# Patient Record
Sex: FEMALE | Race: WHITE | NOT HISPANIC OR LATINO | URBAN - NONMETROPOLITAN AREA
[De-identification: names, ages, dates, MRNs, and addresses within clinical notes are randomized per-mention and may not be internally consistent; named-entity substitution may affect disease eponyms.]

---

## 2021-06-01 ENCOUNTER — OFFICE VISIT (OUTPATIENT)
Dept: URBAN - NONMETROPOLITAN AREA CLINIC 2 | Facility: CLINIC | Age: 51
End: 2021-06-01
Payer: COMMERCIAL

## 2021-06-01 ENCOUNTER — WEB ENCOUNTER (OUTPATIENT)
Dept: URBAN - NONMETROPOLITAN AREA CLINIC 2 | Facility: CLINIC | Age: 51
End: 2021-06-01

## 2021-06-01 ENCOUNTER — LAB OUTSIDE AN ENCOUNTER (OUTPATIENT)
Dept: URBAN - NONMETROPOLITAN AREA CLINIC 2 | Facility: CLINIC | Age: 51
End: 2021-06-01

## 2021-06-01 DIAGNOSIS — Z12.11 COLON CANCER SCREENING: ICD-10-CM

## 2021-06-01 DIAGNOSIS — K21.9 GERD (GASTROESOPHAGEAL REFLUX DISEASE): ICD-10-CM

## 2021-06-01 DIAGNOSIS — K64.4 EXTERNAL HEMORRHOID: ICD-10-CM

## 2021-06-01 DIAGNOSIS — Z83.71 FAMILY HISTORY OF COLONIC POLYPS: ICD-10-CM

## 2021-06-01 PROCEDURE — 99204 OFFICE O/P NEW MOD 45 MIN: CPT | Performed by: INTERNAL MEDICINE

## 2021-06-01 RX ORDER — DEXLANSOPRAZOLE 60 MG/1
TAKE 1 CAPSULE (60 MG) BY ORAL ROUTE ONCE DAILY CAPSULE, DELAYED RELEASE ORAL 1
OUTPATIENT

## 2021-06-01 RX ORDER — LISINOPRIL 20 MG/1
TABLET ORAL
Qty: 90 UNSPECIFIED | Status: ACTIVE | COMMUNITY

## 2021-06-01 RX ORDER — BUPROPION HYDROCHLORIDE 150 MG/1
TABLET, EXTENDED RELEASE ORAL
Qty: 90 UNSPECIFIED | Status: ACTIVE | COMMUNITY

## 2021-06-01 RX ORDER — DEXLANSOPRAZOLE 60 MG/1
TAKE 1 CAPSULE (60 MG) BY ORAL ROUTE ONCE DAILY CAPSULE, DELAYED RELEASE ORAL 1
Qty: 0 | Refills: 0 | Status: ACTIVE | COMMUNITY
Start: 1900-01-01

## 2021-06-01 RX ORDER — SODIUM PICOSULFATE, MAGNESIUM OXIDE, AND ANHYDROUS CITRIC ACID 10; 3.5; 12 MG/160ML; G/160ML; G/160ML
160 ML AS DIRECTED LIQUID ORAL ONCE
Qty: 1 KIT | Refills: 0 | OUTPATIENT
Start: 2021-06-01 | End: 2021-06-02

## 2021-06-01 NOTE — HPI-TODAY'S VISIT:
6/1/21 Mrs. Denisse Jacques is a very pleasant 50YO F who presents with complaint of hemorrhoids. She reports hx external hemorrhoid for 16 years. Recently she was helping transfer her debilitated mother in law to the toilet and had significant straining. She felt like the hemorrhoid was 2-3 times normal size when she went to the bathroom. This was tender and she noted a scant amount of brb on the toilet tissue. She treated with vaseline and desitin diaper rash ointment and this was improved. Currently, hemorrhoid is typical size but would like to see if something can be done. Denies any bleeding at this time. Her bowel movements are normal.   She has history of GERD with last EGD in 2018 by Dr. Mancia with erosive gastropathy and reflux esophagitis. Histology was negative for Barretts, H. pylori, and celiac. She was to follow up in office and consider weaning Dexilant 60mg daily to 30mg daily but did not follow up. She has family history of esophageal cancer in her father.   She last had colonoscopy by Dr. Marc Corea in 2015 that was normal and recommended to repeat in 5 years. She reports f/h of colon polyps in her mother and father. Recent labs with Dr. Latricia Maynard were normal at the time of her physical last week. TG

## 2021-06-01 NOTE — PHYSICAL EXAM GASTROINTESTINAL
Abdomen  , soft, nontender, nondistended , no masses palpable , normal bowel sounds , no hepatomegaly present  , Rectal , normal sphincter tone , nonbleeding moderate to large external hemorrhoids noted, rectal masses or bleeding present

## 2021-06-30 ENCOUNTER — OFFICE VISIT (OUTPATIENT)
Dept: URBAN - NONMETROPOLITAN AREA SURGERY CENTER 1 | Facility: SURGERY CENTER | Age: 51
End: 2021-06-30
Payer: COMMERCIAL

## 2021-06-30 DIAGNOSIS — Z80.0 FAMILY HISTORY MALIGNANT NEOPLASM OF BILIARY TRACT: ICD-10-CM

## 2021-06-30 DIAGNOSIS — Z12.11 COLON CANCER SCREENING: ICD-10-CM

## 2021-06-30 PROCEDURE — G0105 COLORECTAL SCRN; HI RISK IND: HCPCS | Performed by: INTERNAL MEDICINE

## 2021-06-30 PROCEDURE — G8907 PT DOC NO EVENTS ON DISCHARG: HCPCS | Performed by: INTERNAL MEDICINE

## 2021-07-06 ENCOUNTER — ERX REFILL RESPONSE (OUTPATIENT)
Dept: URBAN - NONMETROPOLITAN AREA CLINIC 2 | Facility: CLINIC | Age: 51
End: 2021-07-06

## 2021-07-06 RX ORDER — DEXLANSOPRAZOLE 30 MG/1
TAKE 1 CAPSULE BY MOUTH EVERY DAY CAPSULE, DELAYED RELEASE ORAL
Qty: 30 CAPSULE | Refills: 2 | OUTPATIENT

## 2021-07-26 ENCOUNTER — OFFICE VISIT (OUTPATIENT)
Dept: URBAN - NONMETROPOLITAN AREA CLINIC 2 | Facility: CLINIC | Age: 51
End: 2021-07-26
Payer: COMMERCIAL

## 2021-07-26 VITALS
DIASTOLIC BLOOD PRESSURE: 77 MMHG | HEART RATE: 72 BPM | BODY MASS INDEX: 33.29 KG/M2 | WEIGHT: 195 LBS | TEMPERATURE: 97.6 F | HEIGHT: 64 IN | SYSTOLIC BLOOD PRESSURE: 119 MMHG

## 2021-07-26 DIAGNOSIS — K64.4 EXTERNAL HEMORRHOID: ICD-10-CM

## 2021-07-26 DIAGNOSIS — Z12.11 COLON CANCER SCREENING: ICD-10-CM

## 2021-07-26 DIAGNOSIS — Z83.71 FAMILY HISTORY OF COLONIC POLYPS: ICD-10-CM

## 2021-07-26 DIAGNOSIS — Z80.0 FAMILY HISTORY OF ESOPHAGEAL CANCER: ICD-10-CM

## 2021-07-26 DIAGNOSIS — K21.9 GERD (GASTROESOPHAGEAL REFLUX DISEASE): ICD-10-CM

## 2021-07-26 PROCEDURE — 99214 OFFICE O/P EST MOD 30 MIN: CPT | Performed by: INTERNAL MEDICINE

## 2021-07-26 RX ORDER — DEXLANSOPRAZOLE 30 MG/1
TAKE 1 CAPSULE BY MOUTH EVERY DAY CAPSULE, DELAYED RELEASE ORAL
Qty: 30 CAPSULE | Refills: 2 | Status: ACTIVE | COMMUNITY

## 2021-07-26 RX ORDER — BUPROPION HYDROCHLORIDE 150 MG/1
TABLET, EXTENDED RELEASE ORAL
Qty: 90 UNSPECIFIED | Status: ACTIVE | COMMUNITY

## 2021-07-26 RX ORDER — LISINOPRIL 20 MG/1
TABLET ORAL
Qty: 90 UNSPECIFIED | Status: ACTIVE | COMMUNITY

## 2021-07-26 NOTE — HPI-TODAY'S VISIT:
6/1/21 Mrs. Denisse Jacques is a very pleasant 52YO F who presents with complaint of hemorrhoids. She reports hx external hemorrhoid for 16 years. Recently she was helping transfer her debilitated mother in law to the toilet and had significant straining. She felt like the hemorrhoid was 2-3 times normal size when she went to the bathroom. This was tender and she noted a scant amount of brb on the toilet tissue. She treated with vaseline and desitin diaper rash ointment and this was improved. Currently, hemorrhoid is typical size but would like to see if something can be done. Denies any bleeding at this time. Her bowel movements are normal.   She has history of GERD with last EGD in 2018 by Dr. Mancia with erosive gastropathy and reflux esophagitis. Histology was negative for Barretts, H. pylori, and celiac. She was to follow up in office and consider weaning Dexilant 60mg daily to 30mg daily but did not follow up. She has family history of esophageal cancer in her father.   She last had colonoscopy by Dr. Marc Corea in 2015 that was normal and recommended to repeat in 5 years. She reports f/h of colon polyps in her mother and father. Recent labs with Dr. Latricia Maynard were normal at the time of her physical last week. TG   7/26/2021 Mrs. Jacques presents for colonoscopy follow-up.  Her colonoscopy is normal.  She does have grade 1 internal hemorrhoids, she also had moderate size external hemorrhoids on exam.  She did use the steroid ointment with some relief however she continues to have swelling and itching.  She eats a high-fiber diet has a soft bowel movement daily.  She was able to wean her Dexilant to 30 mg daily.  She does feel like she could reduce her dose.  Her last EGD was in 2018 with gastritis and esophagitis as listed above.  She had no Cruz's esophagus.  She is hyper aware of esophageal cancer given her dad's history.  Today we had a long discussion regarding her risks.  Today she is doing well otherwise, would like to wean her PPI.  MB

## 2021-09-10 ENCOUNTER — ERX REFILL RESPONSE (OUTPATIENT)
Dept: URBAN - NONMETROPOLITAN AREA CLINIC 2 | Facility: CLINIC | Age: 51
End: 2021-09-10

## 2021-09-10 RX ORDER — DEXLANSOPRAZOLE 30 MG/1
TAKE 1 CAPSULE BY MOUTH EVERY DAY CAPSULE, DELAYED RELEASE ORAL
Qty: 30 CAPSULE | Refills: 2 | OUTPATIENT

## 2021-09-10 RX ORDER — DEXLANSOPRAZOLE 30 MG/1
TAKE 1 CAPSULE BY MOUTH EVERY DAY CAPSULE, DELAYED RELEASE ORAL
Qty: 90 CAPSULE | Refills: 0 | OUTPATIENT

## 2021-12-30 ENCOUNTER — ERX REFILL RESPONSE (OUTPATIENT)
Dept: URBAN - NONMETROPOLITAN AREA CLINIC 2 | Facility: CLINIC | Age: 51
End: 2021-12-30

## 2021-12-30 RX ORDER — DEXLANSOPRAZOLE 30 MG/1
TAKE 1 CAPSULE BY MOUTH EVERY DAY X 90 DAY(S) CAPSULE, DELAYED RELEASE ORAL
Qty: 90 CAPSULE | Refills: 0 | OUTPATIENT

## 2021-12-30 RX ORDER — DEXLANSOPRAZOLE 30 MG/1
TAKE 1 CAPSULE BY MOUTH EVERY DAY X 90 DAY(S) CAPSULE, DELAYED RELEASE ORAL
Qty: 90 CAPSULE | Refills: 4 | OUTPATIENT

## 2022-01-10 ENCOUNTER — OFFICE VISIT (OUTPATIENT)
Dept: URBAN - NONMETROPOLITAN AREA CLINIC 2 | Facility: CLINIC | Age: 52
End: 2022-01-10

## 2022-03-14 ENCOUNTER — OFFICE VISIT (OUTPATIENT)
Dept: URBAN - NONMETROPOLITAN AREA CLINIC 2 | Facility: CLINIC | Age: 52
End: 2022-03-14
Payer: COMMERCIAL

## 2022-03-14 DIAGNOSIS — Z83.71 FAMILY HISTORY OF COLONIC POLYPS: ICD-10-CM

## 2022-03-14 DIAGNOSIS — K21.9 GERD (GASTROESOPHAGEAL REFLUX DISEASE): ICD-10-CM

## 2022-03-14 DIAGNOSIS — Z12.11 COLON CANCER SCREENING: ICD-10-CM

## 2022-03-14 DIAGNOSIS — Z80.0 FAMILY HISTORY OF ESOPHAGEAL CANCER: ICD-10-CM

## 2022-03-14 DIAGNOSIS — K64.4 EXTERNAL HEMORRHOID: ICD-10-CM

## 2022-03-14 PROCEDURE — 99214 OFFICE O/P EST MOD 30 MIN: CPT | Performed by: NURSE PRACTITIONER

## 2022-03-14 RX ORDER — BUPROPION HYDROCHLORIDE 150 MG/1
TABLET, EXTENDED RELEASE ORAL
Qty: 90 UNSPECIFIED | Status: ACTIVE | COMMUNITY

## 2022-03-14 RX ORDER — LISINOPRIL 20 MG/1
TABLET ORAL
Qty: 90 UNSPECIFIED | Status: ACTIVE | COMMUNITY

## 2022-03-14 RX ORDER — DEXLANSOPRAZOLE 30 MG/1
TAKE 1 CAPSULE BY MOUTH EVERY DAY X 90 DAY(S) CAPSULE, DELAYED RELEASE ORAL
Qty: 90 CAPSULE | Refills: 4 | Status: ACTIVE | COMMUNITY

## 2022-03-14 RX ORDER — LANSOPRAZOLE 15 MG/1
1 TABLET TABLET, ORALLY DISINTEGRATING, DELAYED RELEASE ORAL TWICE DAILY
Qty: 60 TABLET | Refills: 11 | OUTPATIENT
Start: 2022-03-14

## 2022-03-14 NOTE — HPI-TODAY'S VISIT:
6/1/21 Mrs. Denisse Jacques is a very pleasant 50YO F who presents with complaint of hemorrhoids. She reports hx external hemorrhoid for 16 years. Recently she was helping transfer her debilitated mother in law to the toilet and had significant straining. She felt like the hemorrhoid was 2-3 times normal size when she went to the bathroom. This was tender and she noted a scant amount of brb on the toilet tissue. She treated with vaseline and desitin diaper rash ointment and this was improved. Currently, hemorrhoid is typical size but would like to see if something can be done. Denies any bleeding at this time. Her bowel movements are normal.   She has history of GERD with last EGD in 2018 by Dr. Mancia with erosive gastropathy and reflux esophagitis. Histology was negative for Barretts, H. pylori, and celiac. She was to follow up in office and consider weaning Dexilant 60mg daily to 30mg daily but did not follow up. She has family history of esophageal cancer in her father.   She last had colonoscopy by Dr. Marc Corea in 2015 that was normal and recommended to repeat in 5 years. She reports f/h of colon polyps in her mother and father. Recent labs with Dr. Latricia Maynard were normal at the time of her physical last week. TG   7/26/2021 Mrs. Jacques presents for colonoscopy follow-up.  Her colonoscopy is normal.  She does have grade 1 internal hemorrhoids, she also had moderate size external hemorrhoids on exam.  She did use the steroid ointment with some relief however she continues to have swelling and itching.  She eats a high-fiber diet has a soft bowel movement daily.  She was able to wean her Dexilant to 30 mg daily.  She does feel like she could reduce her dose.  Her last EGD was in 2018 with gastritis and esophagitis as listed above.  She had no Cruz's esophagus.  She is hyper aware of esophageal cancer given her dad's history.  Today we had a long discussion regarding her risks.  Today she is doing well otherwise, would like to wean her PPI.  MB 3/14/2022 Khadar presents for follow-up of reflux.  Since her last visit she has been doing well.  She did not wean her Dexilant.  She has no significant breakthrough reflux symptoms.  Today we had a long discussion regarding her risk of Cruz's esophagus and esophageal cancer.  Her last EGD was in 2018 with an irregular Z-line, but no changes of metaplasia or Cruz's on her biopsies.  Her last colonoscopy was in June 2021 with a normal colon and internal hemorrhoids.  Today she is doing well otherwise, she does agree to try and wean her lansoprazole.  We have discussed repeating her EGD at some depending on her clinical course, this is not indicated at this time. MB

## 2022-08-01 ENCOUNTER — OFFICE VISIT (OUTPATIENT)
Dept: URBAN - NONMETROPOLITAN AREA CLINIC 2 | Facility: CLINIC | Age: 52
End: 2022-08-01
Payer: COMMERCIAL

## 2022-08-01 VITALS
TEMPERATURE: 97 F | SYSTOLIC BLOOD PRESSURE: 139 MMHG | HEART RATE: 70 BPM | WEIGHT: 193 LBS | HEIGHT: 64 IN | BODY MASS INDEX: 32.95 KG/M2 | DIASTOLIC BLOOD PRESSURE: 88 MMHG

## 2022-08-01 DIAGNOSIS — Z80.0 FAMILY HISTORY OF ESOPHAGEAL CANCER: ICD-10-CM

## 2022-08-01 DIAGNOSIS — K64.4 EXTERNAL HEMORRHOID: ICD-10-CM

## 2022-08-01 DIAGNOSIS — K21.9 GERD (GASTROESOPHAGEAL REFLUX DISEASE): ICD-10-CM

## 2022-08-01 DIAGNOSIS — Z12.11 COLON CANCER SCREENING: ICD-10-CM

## 2022-08-01 DIAGNOSIS — Z83.71 FAMILY HISTORY OF COLONIC POLYPS: ICD-10-CM

## 2022-08-01 PROCEDURE — 99214 OFFICE O/P EST MOD 30 MIN: CPT | Performed by: NURSE PRACTITIONER

## 2022-08-01 RX ORDER — LANSOPRAZOLE 15 MG/1
1 TABLET TABLET, ORALLY DISINTEGRATING, DELAYED RELEASE ORAL TWICE DAILY
Qty: 60 TABLET | Refills: 11 | Status: ACTIVE | COMMUNITY
Start: 2022-03-14

## 2022-08-01 RX ORDER — DEXLANSOPRAZOLE 30 MG/1
TAKE 1 CAPSULE BY MOUTH EVERY DAY X 90 DAY(S) CAPSULE, DELAYED RELEASE ORAL
Qty: 90 CAPSULE | Refills: 4 | Status: ACTIVE | COMMUNITY

## 2022-08-01 RX ORDER — BUPROPION HYDROCHLORIDE 150 MG/1
TABLET, EXTENDED RELEASE ORAL
Qty: 90 UNSPECIFIED | Status: ACTIVE | COMMUNITY

## 2022-08-01 RX ORDER — LISINOPRIL 20 MG/1
TABLET ORAL
Qty: 90 UNSPECIFIED | Status: ACTIVE | COMMUNITY

## 2022-08-01 NOTE — HPI-TODAY'S VISIT:
6/1/21 Mrs. Denisse Jacques is a very pleasant 52YO F who presents with complaint of hemorrhoids. She reports hx external hemorrhoid for 16 years. Recently she was helping transfer her debilitated mother in law to the toilet and had significant straining. She felt like the hemorrhoid was 2-3 times normal size when she went to the bathroom. This was tender and she noted a scant amount of brb on the toilet tissue. She treated with vaseline and desitin diaper rash ointment and this was improved. Currently, hemorrhoid is typical size but would like to see if something can be done. Denies any bleeding at this time. Her bowel movements are normal.   She has history of GERD with last EGD in 2018 by Dr. Mancia with erosive gastropathy and reflux esophagitis. Histology was negative for Barretts, H. pylori, and celiac. She was to follow up in office and consider weaning Dexilant 60mg daily to 30mg daily but did not follow up. She has family history of esophageal cancer in her father.   She last had colonoscopy by Dr. Marc Corea in 2015 that was normal and recommended to repeat in 5 years. She reports f/h of colon polyps in her mother and father. Recent labs with Dr. Latricia Maynard were normal at the time of her physical last week. TG   7/26/2021 Mrs. Jacques presents for colonoscopy follow-up.  Her colonoscopy is normal.  She does have grade 1 internal hemorrhoids, she also had moderate size external hemorrhoids on exam.  She did use the steroid ointment with some relief however she continues to have swelling and itching.  She eats a high-fiber diet has a soft bowel movement daily.  She was able to wean her Dexilant to 30 mg daily.  She does feel like she could reduce her dose.  Her last EGD was in 2018 with gastritis and esophagitis as listed above.  She had no Cruz's esophagus.  She is hyper aware of esophageal cancer given her dad's history.  Today we had a long discussion regarding her risks.  Today she is doing well otherwise, would like to wean her PPI.  MB 3/14/2022 Khadar presents for follow-up of reflux.  Since her last visit she has been doing well.  She did not wean her Dexilant.  She has no significant breakthrough reflux symptoms.  Today we had a long discussion regarding her risk of Cruz's esophagus and esophageal cancer.  Her last EGD was in 2018 with an irregular Z-line, but no changes of metaplasia or Cruz's on her biopsies.  Her last colonoscopy was in June 2021 with a normal colon and internal hemorrhoids.  Today she is doing well otherwise, she does agree to try and wean her lansoprazole.  We have discussed repeating her EGD at some depending on her clinical course, this is not indicated at this time. MB 8/1/2022 Mrs. Jacques presents for follow-up of reflux.  Since her last visit she has weaned down to Prevacid 15 mg daily with stable symptoms.  She is only had to take an extra dose approximately 4 times.  She denies any dysphagia, severe breakthrough heartburn, or weight loss.  Her last endoscopy was in 2018 with mild reflux changes, no Cruz's or metaplasia.  Today her bowels are moving regularly.  She denies any new GI complaints.  She does plan to speak with Dr. Benítez regarding her bone density and her long-term use of high-dose PPI.  MB

## 2022-09-19 ENCOUNTER — TELEPHONE ENCOUNTER (OUTPATIENT)
Dept: URBAN - NONMETROPOLITAN AREA CLINIC 2 | Facility: CLINIC | Age: 52
End: 2022-09-19

## 2022-09-22 ENCOUNTER — TELEPHONE ENCOUNTER (OUTPATIENT)
Dept: URBAN - METROPOLITAN AREA CLINIC 92 | Facility: CLINIC | Age: 52
End: 2022-09-22

## 2022-09-26 ENCOUNTER — OFFICE VISIT (OUTPATIENT)
Dept: URBAN - NONMETROPOLITAN AREA CLINIC 2 | Facility: CLINIC | Age: 52
End: 2022-09-26
Payer: COMMERCIAL

## 2022-09-26 ENCOUNTER — TELEPHONE ENCOUNTER (OUTPATIENT)
Dept: URBAN - NONMETROPOLITAN AREA CLINIC 13 | Facility: CLINIC | Age: 52
End: 2022-09-26

## 2022-09-26 VITALS
HEART RATE: 76 BPM | SYSTOLIC BLOOD PRESSURE: 122 MMHG | DIASTOLIC BLOOD PRESSURE: 78 MMHG | TEMPERATURE: 97.6 F | HEIGHT: 64 IN | BODY MASS INDEX: 33.32 KG/M2 | WEIGHT: 195.2 LBS

## 2022-09-26 DIAGNOSIS — Z12.11 COLON CANCER SCREENING: ICD-10-CM

## 2022-09-26 DIAGNOSIS — Z80.0 FAMILY HISTORY OF ESOPHAGEAL CANCER: ICD-10-CM

## 2022-09-26 DIAGNOSIS — K21.9 GERD (GASTROESOPHAGEAL REFLUX DISEASE): ICD-10-CM

## 2022-09-26 DIAGNOSIS — R10.13 DYSPEPSIA: ICD-10-CM

## 2022-09-26 DIAGNOSIS — K64.4 EXTERNAL HEMORRHOID: ICD-10-CM

## 2022-09-26 DIAGNOSIS — R19.7 DIARRHEA OF PRESUMED INFECTIOUS ORIGIN: ICD-10-CM

## 2022-09-26 DIAGNOSIS — Z83.71 FAMILY HISTORY OF COLONIC POLYPS: ICD-10-CM

## 2022-09-26 PROBLEM — 430331003: Status: ACTIVE | Noted: 2021-06-01

## 2022-09-26 PROBLEM — 23913003: Status: ACTIVE | Noted: 2021-06-01

## 2022-09-26 PROBLEM — 43240000: Status: ACTIVE | Noted: 2022-09-26

## 2022-09-26 PROBLEM — 162031009: Status: ACTIVE | Noted: 2022-09-26

## 2022-09-26 PROBLEM — 429969003: Status: ACTIVE | Noted: 2021-06-01

## 2022-09-26 PROCEDURE — 99214 OFFICE O/P EST MOD 30 MIN: CPT | Performed by: NURSE PRACTITIONER

## 2022-09-26 RX ORDER — RIFAXIMIN 550 MG/1
1 TABLET TABLET ORAL THREE TIMES A DAY
Qty: 42 TABLET | Refills: 2 | OUTPATIENT
Start: 2022-09-26 | End: 2022-11-06

## 2022-09-26 RX ORDER — LISINOPRIL 20 MG/1
TABLET ORAL
Qty: 90 UNSPECIFIED | Status: ACTIVE | COMMUNITY

## 2022-09-26 RX ORDER — DEXLANSOPRAZOLE 30 MG/1
TAKE 1 CAPSULE BY MOUTH EVERY DAY X 90 DAY(S) CAPSULE, DELAYED RELEASE ORAL
Qty: 90 CAPSULE | Refills: 4 | Status: ACTIVE | COMMUNITY

## 2022-09-26 RX ORDER — BUPROPION HYDROCHLORIDE 150 MG/1
TABLET, EXTENDED RELEASE ORAL
Qty: 90 UNSPECIFIED | Status: ACTIVE | COMMUNITY

## 2022-09-26 RX ORDER — LANSOPRAZOLE 15 MG/1
1 TABLET TABLET, ORALLY DISINTEGRATING, DELAYED RELEASE ORAL TWICE DAILY
Qty: 60 TABLET | Refills: 11 | Status: ACTIVE | COMMUNITY
Start: 2022-03-14

## 2022-09-26 NOTE — HPI-TODAY'S VISIT:
6/1/21 Mrs. Denisse Jacques is a very pleasant 52YO F who presents with complaint of hemorrhoids. She reports hx external hemorrhoid for 16 years. Recently she was helping transfer her debilitated mother in law to the toilet and had significant straining. She felt like the hemorrhoid was 2-3 times normal size when she went to the bathroom. This was tender and she noted a scant amount of brb on the toilet tissue. She treated with vaseline and desitin diaper rash ointment and this was improved. Currently, hemorrhoid is typical size but would like to see if something can be done. Denies any bleeding at this time. Her bowel movements are normal.   She has history of GERD with last EGD in 2018 by Dr. Mancia with erosive gastropathy and reflux esophagitis. Histology was negative for Barretts, H. pylori, and celiac. She was to follow up in office and consider weaning Dexilant 60mg daily to 30mg daily but did not follow up. She has family history of esophageal cancer in her father.   She last had colonoscopy by Dr. Marc Corea in 2015 that was normal and recommended to repeat in 5 years. She reports f/h of colon polyps in her mother and father. Recent labs with Dr. Latricia Maynard were normal at the time of her physical last week. TG   7/26/2021 Mrs. Jacques presents for colonoscopy follow-up.  Her colonoscopy is normal.  She does have grade 1 internal hemorrhoids, she also had moderate size external hemorrhoids on exam.  She did use the steroid ointment with some relief however she continues to have swelling and itching.  She eats a high-fiber diet has a soft bowel movement daily.  She was able to wean her Dexilant to 30 mg daily.  She does feel like she could reduce her dose.  Her last EGD was in 2018 with gastritis and esophagitis as listed above.  She had no Cruz's esophagus.  She is hyper aware of esophageal cancer given her dad's history.  Today we had a long discussion regarding her risks.  Today she is doing well otherwise, would like to wean her PPI.  MB 3/14/2022 Khadar presents for follow-up of reflux.  Since her last visit she has been doing well.  She did not wean her Dexilant.  She has no significant breakthrough reflux symptoms.  Today we had a long discussion regarding her risk of Cruz's esophagus and esophageal cancer.  Her last EGD was in 2018 with an irregular Z-line, but no changes of metaplasia or Cruz's on her biopsies.  Her last colonoscopy was in June 2021 with a normal colon and internal hemorrhoids.  Today she is doing well otherwise, she does agree to try and wean her lansoprazole.  We have discussed repeating her EGD at some depending on her clinical course, this is not indicated at this time. MB 8/1/2022 Mrs. Jacques presents for follow-up of reflux.  Since her last visit she has weaned down to Prevacid 15 mg daily with stable symptoms.  She is only had to take an extra dose approximately 4 times.  She denies any dysphagia, severe breakthrough heartburn, or weight loss.  Her last endoscopy was in 2018 with mild reflux changes, no Cruz's or metaplasia.  Today her bowels are moving regularly.  She denies any new GI complaints.  She does plan to speak with Dr. Benítez regarding her bone density and her long-term use of high-dose PPI.  MB 9/26/2022 Lydia presents for follow-up evaluation of acute onset diarrhea.  A couple weeks ago she had a rectal exam by her gynecologist with blood in her stool.  She subsequently developed an acute gastroenteritis symptoms with dyspepsia nausea and diarrhea.  The dyspepsia has almost resolved however she continues to have 1-2 loose urgent bowel movements in the morning.  She denies any mucus in the stool or rectal bleeding.  She is not taking anything for her symptoms.  Her reflux is stable on Prevacid 15 mg just as needed.  Today her main complaint is the new onset diarrhea.  MB

## 2022-09-27 LAB
A/G RATIO: 2.2
ALBUMIN: 4.3
ALKALINE PHOSPHATASE: 53
ALT (SGPT): 15
AST (SGOT): 13
BASO (ABSOLUTE): 0
BASOS: 1
BILIRUBIN, TOTAL: <0.2
BUN/CREATININE RATIO: 24
BUN: 15
C-REACTIVE PROTEIN, QUANT: 2
CALCIUM: 8.8
CARBON DIOXIDE, TOTAL: 25
CHLORIDE: 104
CREATININE: 0.62
EGFR: 107
EOS (ABSOLUTE): 0.1
EOS: 2
GLOBULIN, TOTAL: 2
GLUCOSE: 89
HEMATOCRIT: 38.3
HEMATOLOGY COMMENTS:: (no result)
HEMOGLOBIN: 12.6
IMMATURE CELLS: (no result)
IMMATURE GRANS (ABS): 0
IMMATURE GRANULOCYTES: 0
LIPASE: 22
LYMPHS (ABSOLUTE): 1.5
LYMPHS: 25
MCH: 29.5
MCHC: 32.9
MCV: 90
MONOCYTES(ABSOLUTE): 0.5
MONOCYTES: 8
NEUTROPHILS (ABSOLUTE): 3.7
NEUTROPHILS: 64
NRBC: (no result)
PLATELETS: 230
POTASSIUM: 4.6
PROTEIN, TOTAL: 6.3
RBC: 4.27
RDW: 12.3
SEDIMENTATION RATE-WESTERGREN: 2
SODIUM: 142
WBC: 5.8

## 2022-09-28 ENCOUNTER — TELEPHONE ENCOUNTER (OUTPATIENT)
Dept: URBAN - METROPOLITAN AREA CLINIC 92 | Facility: CLINIC | Age: 52
End: 2022-09-28

## 2022-11-30 ENCOUNTER — LAB OUTSIDE AN ENCOUNTER (OUTPATIENT)
Dept: URBAN - NONMETROPOLITAN AREA CLINIC 2 | Facility: CLINIC | Age: 52
End: 2022-11-30

## 2022-12-05 LAB
C DIFFICILE TOXIN GENE NAA: NEGATIVE
C DIFFICILE TOXINS A+B, EIA: NEGATIVE
C DIFFICILE, CYTOTOXIN B: (no result)
CALPROTECTIN, FECAL: 53
CAMPYLOBACTER CULTURE: (no result)
E COLI SHIGA TOXIN EIA: NEGATIVE
GIARDIA LAMBLIA AG, EIA: NEGATIVE
H. PYLORI STOOL AG, EIA: NEGATIVE
Lab: (no result)
OCCULT BLOOD, FECAL, IA: NEGATIVE
OVA + PARASITE EXAM: (no result)
SALMONELLA/SHIGELLA SCREEN: (no result)
WHITE BLOOD CELLS (WBC), STOOL: (no result)

## 2023-02-06 ENCOUNTER — OFFICE VISIT (OUTPATIENT)
Dept: URBAN - NONMETROPOLITAN AREA CLINIC 2 | Facility: CLINIC | Age: 53
End: 2023-02-06

## 2024-05-06 ENCOUNTER — DASHBOARD ENCOUNTERS (OUTPATIENT)
Age: 54
End: 2024-05-06

## 2024-05-06 ENCOUNTER — LAB OUTSIDE AN ENCOUNTER (OUTPATIENT)
Dept: URBAN - NONMETROPOLITAN AREA CLINIC 13 | Facility: CLINIC | Age: 54
End: 2024-05-06

## 2024-05-06 ENCOUNTER — OFFICE VISIT (OUTPATIENT)
Dept: URBAN - NONMETROPOLITAN AREA CLINIC 13 | Facility: CLINIC | Age: 54
End: 2024-05-06
Payer: COMMERCIAL

## 2024-05-06 VITALS
WEIGHT: 197 LBS | TEMPERATURE: 98 F | HEART RATE: 60 BPM | BODY MASS INDEX: 33.63 KG/M2 | HEIGHT: 64 IN | SYSTOLIC BLOOD PRESSURE: 125 MMHG | DIASTOLIC BLOOD PRESSURE: 85 MMHG

## 2024-05-06 DIAGNOSIS — R10.84 GENERALIZED ABDOMINAL PAIN: ICD-10-CM

## 2024-05-06 DIAGNOSIS — R93.5 ABNORMAL CT OF THE ABDOMEN: ICD-10-CM

## 2024-05-06 DIAGNOSIS — Z83.719 FAMILY HISTORY OF COLONIC POLYPS: ICD-10-CM

## 2024-05-06 DIAGNOSIS — Z80.0 FAMILY HISTORY OF ESOPHAGEAL CANCER: ICD-10-CM

## 2024-05-06 PROCEDURE — 99204 OFFICE O/P NEW MOD 45 MIN: CPT | Performed by: NURSE PRACTITIONER

## 2024-05-06 RX ORDER — BUPROPION HYDROCHLORIDE 150 MG/1
TABLET, EXTENDED RELEASE ORAL
Qty: 90 UNSPECIFIED | Status: ACTIVE | COMMUNITY

## 2024-05-06 RX ORDER — LANSOPRAZOLE 15 MG/1
1 TABLET TABLET, ORALLY DISINTEGRATING, DELAYED RELEASE ORAL TWICE DAILY
Qty: 60 TABLET | Refills: 11 | Status: ACTIVE | COMMUNITY
Start: 2022-03-14

## 2024-05-06 RX ORDER — LISINOPRIL 20 MG/1
TABLET ORAL
Qty: 90 UNSPECIFIED | Status: ACTIVE | COMMUNITY

## 2024-05-06 RX ORDER — DEXLANSOPRAZOLE 30 MG/1
TAKE 1 CAPSULE BY MOUTH EVERY DAY X 90 DAY(S) CAPSULE, DELAYED RELEASE ORAL
Qty: 90 CAPSULE | Refills: 4 | Status: ACTIVE | COMMUNITY

## 2024-06-06 ENCOUNTER — OUT OF OFFICE VISIT (OUTPATIENT)
Dept: URBAN - NONMETROPOLITAN AREA SURGERY CENTER 1 | Facility: SURGERY CENTER | Age: 54
End: 2024-06-06
Payer: COMMERCIAL

## 2024-06-06 DIAGNOSIS — K31.7 BENIGN GASTRIC POLYP: ICD-10-CM

## 2024-06-06 DIAGNOSIS — R10.30 ABDOMINAL PAIN OF UNKNOWN CAUSE: ICD-10-CM

## 2024-06-06 DIAGNOSIS — R10.30 LOWER ABDOMINAL PAIN: ICD-10-CM

## 2024-06-06 DIAGNOSIS — K29.60 OTHER GASTRITIS WITHOUT BLEEDING: ICD-10-CM

## 2024-06-06 DIAGNOSIS — R10.13 ABDOMINAL DISCOMFORT, EPIGASTRIC: ICD-10-CM

## 2024-06-06 DIAGNOSIS — K31.7 GASTRIC POLYPS: ICD-10-CM

## 2024-06-06 DIAGNOSIS — Z80.0 FAMILY HISTORY OF ESOPHAGEAL CANCER: ICD-10-CM

## 2024-06-06 PROCEDURE — 45378 DIAGNOSTIC COLONOSCOPY: CPT | Performed by: INTERNAL MEDICINE

## 2024-06-06 PROCEDURE — 00813 ANES UPR LWR GI NDSC PX: CPT | Performed by: NURSE ANESTHETIST, CERTIFIED REGISTERED

## 2024-06-06 PROCEDURE — 43239 EGD BIOPSY SINGLE/MULTIPLE: CPT | Performed by: INTERNAL MEDICINE

## 2024-06-06 PROCEDURE — G8907 PT DOC NO EVENTS ON DISCHARG: HCPCS | Performed by: INTERNAL MEDICINE

## 2024-06-06 RX ORDER — BUPROPION HYDROCHLORIDE 150 MG/1
TABLET, EXTENDED RELEASE ORAL
Qty: 90 UNSPECIFIED | Status: ACTIVE | COMMUNITY

## 2024-06-06 RX ORDER — LISINOPRIL 20 MG/1
TABLET ORAL
Qty: 90 UNSPECIFIED | Status: ACTIVE | COMMUNITY

## 2024-06-06 RX ORDER — DEXLANSOPRAZOLE 30 MG/1
TAKE 1 CAPSULE BY MOUTH EVERY DAY X 90 DAY(S) CAPSULE, DELAYED RELEASE ORAL
Qty: 90 CAPSULE | Refills: 4 | Status: ACTIVE | COMMUNITY

## 2024-06-06 RX ORDER — LANSOPRAZOLE 15 MG/1
1 TABLET TABLET, ORALLY DISINTEGRATING, DELAYED RELEASE ORAL TWICE DAILY
Qty: 60 TABLET | Refills: 11 | Status: ACTIVE | COMMUNITY
Start: 2022-03-14

## 2025-05-21 ENCOUNTER — OFFICE VISIT (OUTPATIENT)
Age: 55
End: 2025-05-21
Payer: COMMERCIAL

## 2025-05-21 ENCOUNTER — LAB OUTSIDE AN ENCOUNTER (OUTPATIENT)
Age: 55
End: 2025-05-21

## 2025-05-21 DIAGNOSIS — R10.84 GENERALIZED ABDOMINAL PAIN: ICD-10-CM

## 2025-05-21 DIAGNOSIS — K58.0 IRRITABLE BOWEL SYNDROME WITH DIARRHEA: ICD-10-CM

## 2025-05-21 DIAGNOSIS — K21.9 GERD (GASTROESOPHAGEAL REFLUX DISEASE): ICD-10-CM

## 2025-05-21 DIAGNOSIS — K64.4 EXTERNAL HEMORRHOID: ICD-10-CM

## 2025-05-21 DIAGNOSIS — Z83.710 FAMILY HISTORY OF ADENOMATOUS POLYP OF COLON: ICD-10-CM

## 2025-05-21 DIAGNOSIS — Z80.0 FAMILY HISTORY OF ESOPHAGEAL CANCER: ICD-10-CM

## 2025-05-21 PROBLEM — 197125005: Status: ACTIVE | Noted: 2025-05-21

## 2025-05-21 PROBLEM — 102614006: Status: ACTIVE | Noted: 2025-05-21

## 2025-05-21 PROBLEM — 71691000112100: Status: ACTIVE | Noted: 2025-05-21

## 2025-05-21 PROCEDURE — 99214 OFFICE O/P EST MOD 30 MIN: CPT | Performed by: NURSE PRACTITIONER

## 2025-05-21 RX ORDER — LISINOPRIL 20 MG/1
TABLET ORAL
Qty: 90 UNSPECIFIED | Status: ACTIVE | COMMUNITY

## 2025-05-21 RX ORDER — RIFAXIMIN 550 MG/1
1 TABLET TABLET ORAL THREE TIMES A DAY
Qty: 42 TABLET | Refills: 0 | OUTPATIENT
Start: 2025-05-21 | End: 2025-06-04

## 2025-05-21 RX ORDER — LANSOPRAZOLE 15 MG/1
1 TABLET TABLET, ORALLY DISINTEGRATING, DELAYED RELEASE ORAL TWICE DAILY
Qty: 60 TABLET | Refills: 11 | Status: ACTIVE | COMMUNITY
Start: 2022-03-14

## 2025-05-21 RX ORDER — DEXLANSOPRAZOLE 30 MG/1
TAKE 1 CAPSULE BY MOUTH EVERY DAY X 90 DAY(S) CAPSULE, DELAYED RELEASE ORAL
Qty: 90 CAPSULE | Refills: 4 | Status: ACTIVE | COMMUNITY

## 2025-05-21 RX ORDER — BUPROPION HYDROCHLORIDE 150 MG/1
TABLET, EXTENDED RELEASE ORAL
Qty: 90 UNSPECIFIED | Status: ACTIVE | COMMUNITY

## 2025-05-21 NOTE — HPI-TODAY'S VISIT:
Patient is a pleasant 54-year-old female who presents to discuss abnormal CT scan as well as concerns for GI distress.  She initially made her appointment she was having some postprandial bloating, which she reports is actually better now.  However, she started getting some sharp abdominal pain.  Initially seem to move.  Initially seen more on the right side and occasionally on the left some point was radiating down her leg.  She get a CT scan with a hiatal hernia.  She does have a family history of colon polyps as well esophageal cancer.  She does state symptoms do seem to be improving, but she is worried about something being missed.  Reflux is currently doing okay on lansoprazole OTC. Sb 5/21/2025 Khadar presents for follow-up, since her last visit she is developing worse generalized abdominal pain worse in the right lower quadrant.  This is worse after meals at times and can be related to having a bowel movement.  She has had increased diarrhea.  She is under a great amount of stress as her granddaughter has been admitted since birth with a cardiac congenital abnormality.  Today she agrees to try dicyclomine as needed, will check labs and schedule CT imaging given her right lower quadrant abdominal pain.  If her workup is normal we will try a course of Xifaxan and consider low-dose amitriptyline if no relief.  MB

## 2025-05-22 ENCOUNTER — TELEPHONE ENCOUNTER (OUTPATIENT)
Dept: URBAN - NONMETROPOLITAN AREA CLINIC 2 | Facility: CLINIC | Age: 55
End: 2025-05-22

## 2025-05-29 ENCOUNTER — TELEPHONE ENCOUNTER (OUTPATIENT)
Dept: URBAN - NONMETROPOLITAN AREA CLINIC 2 | Facility: CLINIC | Age: 55
End: 2025-05-29

## 2025-06-02 ENCOUNTER — WEB ENCOUNTER (OUTPATIENT)
Dept: URBAN - NONMETROPOLITAN AREA CLINIC 2 | Facility: CLINIC | Age: 55
End: 2025-06-02

## 2025-06-02 ENCOUNTER — TELEPHONE ENCOUNTER (OUTPATIENT)
Dept: URBAN - NONMETROPOLITAN AREA CLINIC 2 | Facility: CLINIC | Age: 55
End: 2025-06-02

## 2025-06-03 ENCOUNTER — WEB ENCOUNTER (OUTPATIENT)
Dept: URBAN - NONMETROPOLITAN AREA CLINIC 2 | Facility: CLINIC | Age: 55
End: 2025-06-03

## 2025-06-05 ENCOUNTER — WEB ENCOUNTER (OUTPATIENT)
Dept: URBAN - NONMETROPOLITAN AREA CLINIC 2 | Facility: CLINIC | Age: 55
End: 2025-06-05

## 2025-06-06 ENCOUNTER — LAB OUTSIDE AN ENCOUNTER (OUTPATIENT)
Dept: URBAN - NONMETROPOLITAN AREA CLINIC 2 | Facility: CLINIC | Age: 55
End: 2025-06-06

## 2025-06-06 ENCOUNTER — WEB ENCOUNTER (OUTPATIENT)
Dept: URBAN - NONMETROPOLITAN AREA CLINIC 2 | Facility: CLINIC | Age: 55
End: 2025-06-06

## 2025-06-07 LAB
BUN/CREATININE RATIO: 19
BUN: 13
CALCIUM: 9.1
CARBON DIOXIDE, TOTAL: 22
CHLORIDE: 104
CREATININE: 0.67
EGFR: 104
GLUCOSE: 116
POTASSIUM: 4.2
SODIUM: 141

## 2025-08-08 ENCOUNTER — OFFICE VISIT (OUTPATIENT)
Age: 55
End: 2025-08-08

## 2025-08-21 ENCOUNTER — OFFICE VISIT (OUTPATIENT)
Dept: URBAN - NONMETROPOLITAN AREA CLINIC 2 | Facility: CLINIC | Age: 55
End: 2025-08-21